# Patient Record
Sex: FEMALE | Race: WHITE | NOT HISPANIC OR LATINO | Employment: STUDENT | URBAN - METROPOLITAN AREA
[De-identification: names, ages, dates, MRNs, and addresses within clinical notes are randomized per-mention and may not be internally consistent; named-entity substitution may affect disease eponyms.]

---

## 2018-04-11 ENCOUNTER — OFFICE VISIT (OUTPATIENT)
Dept: OBSTETRICS AND GYNECOLOGY | Facility: CLINIC | Age: 23
End: 2018-04-11
Payer: COMMERCIAL

## 2018-04-11 ENCOUNTER — LAB VISIT (OUTPATIENT)
Dept: LAB | Facility: OTHER | Age: 23
End: 2018-04-11
Payer: COMMERCIAL

## 2018-04-11 VITALS
HEIGHT: 63 IN | DIASTOLIC BLOOD PRESSURE: 70 MMHG | BODY MASS INDEX: 21.09 KG/M2 | WEIGHT: 119.06 LBS | SYSTOLIC BLOOD PRESSURE: 112 MMHG

## 2018-04-11 DIAGNOSIS — Z32.01 POSITIVE PREGNANCY TEST: ICD-10-CM

## 2018-04-11 DIAGNOSIS — N91.2 AMENORRHEA: Primary | ICD-10-CM

## 2018-04-11 DIAGNOSIS — N91.2 AMENORRHEA: ICD-10-CM

## 2018-04-11 LAB
ABO + RH BLD: NORMAL
ANION GAP SERPL CALC-SCNC: 8 MMOL/L
B-HCG UR QL: POSITIVE
BASOPHILS # BLD AUTO: 0.02 K/UL
BASOPHILS NFR BLD: 0.2 %
BLD GP AB SCN CELLS X3 SERPL QL: NORMAL
BUN SERPL-MCNC: 9 MG/DL
CALCIUM SERPL-MCNC: 9.6 MG/DL
CHLORIDE SERPL-SCNC: 103 MMOL/L
CO2 SERPL-SCNC: 25 MMOL/L
CREAT SERPL-MCNC: 0.6 MG/DL
CTP QC/QA: YES
DIFFERENTIAL METHOD: ABNORMAL
EOSINOPHIL # BLD AUTO: 0 K/UL
EOSINOPHIL NFR BLD: 0.3 %
ERYTHROCYTE [DISTWIDTH] IN BLOOD BY AUTOMATED COUNT: 12.8 %
EST. GFR  (AFRICAN AMERICAN): >60 ML/MIN/1.73 M^2
EST. GFR  (NON AFRICAN AMERICAN): >60 ML/MIN/1.73 M^2
GLUCOSE SERPL-MCNC: 80 MG/DL
HCT VFR BLD AUTO: 37 %
HGB BLD-MCNC: 12.4 G/DL
LYMPHOCYTES # BLD AUTO: 2.3 K/UL
LYMPHOCYTES NFR BLD: 20.5 %
MCH RBC QN AUTO: 31.9 PG
MCHC RBC AUTO-ENTMCNC: 33.5 G/DL
MCV RBC AUTO: 95 FL
MONOCYTES # BLD AUTO: 0.8 K/UL
MONOCYTES NFR BLD: 6.7 %
NEUTROPHILS # BLD AUTO: 8.1 K/UL
NEUTROPHILS NFR BLD: 72.1 %
PLATELET # BLD AUTO: 319 K/UL
PMV BLD AUTO: 10.5 FL
POTASSIUM SERPL-SCNC: 3.4 MMOL/L
RBC # BLD AUTO: 3.89 M/UL
SODIUM SERPL-SCNC: 136 MMOL/L
WBC # BLD AUTO: 11.27 K/UL

## 2018-04-11 PROCEDURE — 87491 CHLMYD TRACH DNA AMP PROBE: CPT

## 2018-04-11 PROCEDURE — 81220 CFTR GENE COM VARIANTS: CPT

## 2018-04-11 PROCEDURE — 86703 HIV-1/HIV-2 1 RESULT ANTBDY: CPT

## 2018-04-11 PROCEDURE — 86592 SYPHILIS TEST NON-TREP QUAL: CPT

## 2018-04-11 PROCEDURE — 36415 COLL VENOUS BLD VENIPUNCTURE: CPT

## 2018-04-11 PROCEDURE — 87340 HEPATITIS B SURFACE AG IA: CPT

## 2018-04-11 PROCEDURE — 87086 URINE CULTURE/COLONY COUNT: CPT

## 2018-04-11 PROCEDURE — 86762 RUBELLA ANTIBODY: CPT

## 2018-04-11 PROCEDURE — 81025 URINE PREGNANCY TEST: CPT | Mod: S$GLB,,, | Performed by: NURSE PRACTITIONER

## 2018-04-11 PROCEDURE — 85025 COMPLETE CBC W/AUTO DIFF WBC: CPT

## 2018-04-11 PROCEDURE — 86850 RBC ANTIBODY SCREEN: CPT

## 2018-04-11 PROCEDURE — 80048 BASIC METABOLIC PNL TOTAL CA: CPT

## 2018-04-11 PROCEDURE — 99999 PR PBB SHADOW E&M-NEW PATIENT-LVL III: CPT | Mod: PBBFAC,,, | Performed by: NURSE PRACTITIONER

## 2018-04-11 PROCEDURE — 99203 OFFICE O/P NEW LOW 30 MIN: CPT | Mod: S$GLB,,, | Performed by: NURSE PRACTITIONER

## 2018-04-11 NOTE — PROGRESS NOTES
"  CC: Positive Pregnancy Test    HISTORY OF PRESENT ILLNESS:    Alma Cannon is a 22 y.o. female, ,  Presents today for a routine exam complaining of amenorrhea and positive home urine pregnancy test.  Patient's last menstrual period was 2018. (approximate). Pt was not attempting to conceive but is happy. Originally from [place] and will be moving back when she graduates at end of May. This is her first pregnancy. Denies any pertinent PMH or PSH. Currently taking OTC prenatal. Nausea has resolved. Last pap was in the past year or so and normal. No problems today.     LMP: 18  EGA: 9w6d  EDC: 18    ROS:  GENERAL: No weight changes. No swelling. No fatigue. No fever.  CARDIOVASCULAR: No chest pain. No shortness of breath. No leg cramps.   NEUROLOGICAL: No headaches. No vision changes.  BREASTS: No pain. No lumps. No discharge.  ABDOMEN: No pain. No diarrhea. No constipation.  REPRODUCTIVE: No abnormal bleeding.   VULVA: No pain. No lesions. No itching.  VAGINA: No relaxation. No itching. No odor. No discharge. No lesions.  URINARY: No incontinence. No nocturia. No frequency. No dysuria.    MEDICATIONS AND ALLERGIES:  Reviewed    COMPREHENSIVE GYN HISTORY:  PAP History: Denies abnormal Paps.  Infection History: Denies STDs. Denies PID.  Benign History: Denies uterine fibroids. Denies ovarian cysts. Denies endometriosis. Denies other conditions.  Cancer History: Denies cervical cancer. Denies uterine cancer or hyperplasia. Denies ovarian cancer. Denies vulvar cancer or pre-cancer. Denies vaginal cancer or pre-cancer. Denies breast cancer. Denies colon cancer.  Sexual Activity History: Reports currently being sexually active  Menstrual History: None.  Contraception: None    /70   Ht 5' 3" (1.6 m)   Wt 54 kg (119 lb 0.8 oz)   LMP 2018   BMI 21.09 kg/m²     PE:  AFFECT: Calm, alert and oriented X 3. Interactive during exam  GENERAL: Appears well-nourished, well-developed, in no " acute distress.  HEAD: Normocephalic, atruamatic  TEETH: Good dentition.  THYROID: No thyromegally   BREASTS: No masses, skin changes, nipple discharge or adenopathy bilaterally.  SKIN: Normal for race, warm, & dry. No lesions or rashes.  LUNGS: Easy and unlabored, clear to auscultation bilaterally.  HEART: Regular rate and rhythm   ABDOMEN: Soft and nontender without masses or organomegally.  VULVA: No lesions, masses or tenderness.  VAGINA: Moist and well rugated without lesions or discharge.  CERVIX: Moist and pink without lesions, discharge or tenderness.      UTERUS SIZE: 6 week size, nontender and without masses.  ADNEXA: No masses or tenderness.  ESTIMATE OF PELVIC CAPACITY: Adequate  EXTREMITIES: No cyanosis, clubbing or edema. No calf tenderness.  LYMPH NODES: No axillary or inguinal adenopathy.    PROCEDURES:  UPT Positive  Genprobe  Pap current    ASSESSMENT/PLAN:  Amenorrhea  Positive urine pregnancy test (BERHANE: 18, EGA: 9w6d based on LMP)    -  Routine prenatal care    Nausea and vomiting in pregnancy    -  Education regarding lifestyle and dietary modifications    -  Advised use of B6/Unisom. Pt will notify us if no relief/worsening symptoms, will consider Zofran if needed.      1st TRIMESTER COUNSELING: Discussed all, booklet provided:  Common complaints of pregnancy  HIV and other routine prenatal tests including  genetic screening  Risk factors identified by prenatal history  Oriented to practice - discussed anticipated course of prenatal care & indications for Ultrasound  Childbirth classes/Hospital facilities   Nutrition and weight gain counseling  Toxoplasmosis precautions (Cats/Raw Meat)  Sexual activity and exercise  Environmental/Work hazards  Travel  Tobacco (Ask, Advise, Assess, Assist, and Arrange), as well as alcohol and drug use  Use of any medications (Including supplements, Vitamins, Herbs, or OTC Drugs)  Domestic violence  Seat belt use      TERATOLOGY COUNSELING:    Discussed indications and options for aneuploidy screening - pamphlets given      -  Pt declines testing    FOLLOW-UP in 4 weeks   IOB labs today  Dating u/s scheduled  Continue with pnv  Pap current    Christy Longoria, NP  OB/GYN

## 2018-04-12 LAB
C TRACH DNA SPEC QL NAA+PROBE: NOT DETECTED
HBV SURFACE AG SERPL QL IA: NEGATIVE
HIV 1+2 AB+HIV1 P24 AG SERPL QL IA: NEGATIVE
N GONORRHOEA DNA SPEC QL NAA+PROBE: NOT DETECTED
RPR SER QL: NORMAL
RUBV IGG SER-ACNC: 53.5 IU/ML
RUBV IGG SER-IMP: REACTIVE

## 2018-04-13 LAB
BACTERIA UR CULT: NORMAL
CFTR MUT ANL BLD/T: NORMAL

## 2018-04-16 ENCOUNTER — PROCEDURE VISIT (OUTPATIENT)
Dept: OBSTETRICS AND GYNECOLOGY | Facility: CLINIC | Age: 23
End: 2018-04-16
Payer: COMMERCIAL

## 2018-04-16 DIAGNOSIS — D27.9 DERMOID CYST OF OVARY AFFECTING PREGNANCY IN FIRST TRIMESTER, ANTEPARTUM: ICD-10-CM

## 2018-04-16 DIAGNOSIS — Z32.01 POSITIVE PREGNANCY TEST: ICD-10-CM

## 2018-04-16 DIAGNOSIS — Z36.89 ENCOUNTER TO ESTABLISH GESTATIONAL AGE USING ULTRASOUND: ICD-10-CM

## 2018-04-16 DIAGNOSIS — O34.81 DERMOID CYST OF OVARY AFFECTING PREGNANCY IN FIRST TRIMESTER, ANTEPARTUM: ICD-10-CM

## 2018-04-16 PROCEDURE — 76801 OB US < 14 WKS SINGLE FETUS: CPT | Mod: S$GLB,,, | Performed by: OBSTETRICS & GYNECOLOGY

## 2018-04-16 NOTE — PROCEDURES
Procedures     Obstetrical ultrasound completed today.  See report in imaging section of [facility].  Right ovarian dermoid noted.

## 2018-04-23 ENCOUNTER — PATIENT MESSAGE (OUTPATIENT)
Dept: OBSTETRICS AND GYNECOLOGY | Facility: CLINIC | Age: 23
End: 2018-04-23

## 2018-05-07 ENCOUNTER — INITIAL PRENATAL (OUTPATIENT)
Dept: OBSTETRICS AND GYNECOLOGY | Facility: CLINIC | Age: 23
End: 2018-05-07
Payer: COMMERCIAL

## 2018-05-07 VITALS
SYSTOLIC BLOOD PRESSURE: 120 MMHG | WEIGHT: 124.13 LBS | BODY MASS INDEX: 21.99 KG/M2 | DIASTOLIC BLOOD PRESSURE: 70 MMHG

## 2018-05-07 DIAGNOSIS — Z3A.13 13 WEEKS GESTATION OF PREGNANCY: Primary | ICD-10-CM

## 2018-05-07 PROCEDURE — 99212 OFFICE O/P EST SF 10 MIN: CPT | Mod: S$GLB,,, | Performed by: NURSE PRACTITIONER

## 2018-05-07 PROCEDURE — 99999 PR PBB SHADOW E&M-EST. PATIENT-LVL III: CPT | Mod: PBBFAC,,, | Performed by: NURSE PRACTITIONER

## 2018-05-07 NOTE — PROGRESS NOTES
Here for routine OB appt at 13w4d.  No complaints, denies VB and cramping. No problems today. Pain, bleeding, and ED precautions given. Moving at the end of this month-will follow up there. Already has appt scheduled.